# Patient Record
Sex: FEMALE | Race: WHITE | Employment: FULL TIME | URBAN - METROPOLITAN AREA
[De-identification: names, ages, dates, MRNs, and addresses within clinical notes are randomized per-mention and may not be internally consistent; named-entity substitution may affect disease eponyms.]

---

## 2018-11-21 ENCOUNTER — OFFICE VISIT (OUTPATIENT)
Dept: FAMILY MEDICINE CLINIC | Age: 28
End: 2018-11-21

## 2018-11-21 VITALS
HEART RATE: 72 BPM | TEMPERATURE: 97.4 F | HEIGHT: 65 IN | SYSTOLIC BLOOD PRESSURE: 119 MMHG | WEIGHT: 132.1 LBS | BODY MASS INDEX: 22.01 KG/M2 | DIASTOLIC BLOOD PRESSURE: 61 MMHG | RESPIRATION RATE: 18 BRPM | OXYGEN SATURATION: 98 %

## 2018-11-21 DIAGNOSIS — Z87.442 HISTORY OF KIDNEY STONES: ICD-10-CM

## 2018-11-21 DIAGNOSIS — Z86.2 HISTORY OF IRON DEFICIENCY ANEMIA: ICD-10-CM

## 2018-11-21 DIAGNOSIS — Z91.018 FOOD ALLERGY: ICD-10-CM

## 2018-11-21 DIAGNOSIS — Z00.00 WELL WOMAN EXAM (NO GYNECOLOGICAL EXAM): Primary | ICD-10-CM

## 2018-11-21 RX ORDER — DROSPIRENONE AND ETHINYL ESTRADIOL 0.02-3(28)
KIT ORAL DAILY
COMMUNITY

## 2018-11-21 RX ORDER — EPINEPHRINE 0.3 MG/.3ML
0.3 INJECTION SUBCUTANEOUS
COMMUNITY
End: 2018-11-21 | Stop reason: ALTCHOICE

## 2018-11-21 RX ORDER — EPINEPHRINE 0.3 MG/.3ML
0.3 INJECTION SUBCUTANEOUS
Qty: 2 SYRINGE | Refills: 1 | Status: SHIPPED | OUTPATIENT
Start: 2018-11-21 | End: 2018-11-21

## 2018-11-21 RX ORDER — MOMETASONE FUROATE 1 MG/G
CREAM TOPICAL DAILY
COMMUNITY
End: 2019-01-04 | Stop reason: SDUPTHER

## 2018-11-21 NOTE — PROGRESS NOTES
April Xavi  Identified pt with two pt identifiers(name and ). Chief Complaint   Patient presents with   [facility] 9/est care       1. Have you been to the ER, urgent care clinic since your last visit? Hospitalized since your last visit? no    2. Have you seen or consulted any other health care providers outside of the [address] [place] [address] since your last visit? Include any pap smears or colon screening. no      Advance Care Planning    In the event something were to happen to you and you were unable to speak on your behalf, do you have an Advance Directive/ Living Will in place stating your wishes? NO    If yes, do we have a copy on file NO    If no, would you like information NO    Medication reconciliation up to date and corrected with patient at this time. Today's provider has been notified of reason for visit, vitals and flowsheets obtained on patients. Reviewed record in preparation for visit, huddled with provider and have obtained necessary documentation. Health Maintenance Due   Topic    HPV Age 9Y-34Y (1 - Female 3-dose series)    DTaP/Tdap/Td series (1 - Tdap)    PAP AKA CERVICAL CYTOLOGY     Influenza Age 5 to Adult        Wt Readings from Last 3 Encounters:   No data found for Wt     Temp Readings from Last 3 Encounters:   No data found for Temp     BP Readings from Last 3 Encounters:   No data found for BP     Pulse Readings from Last 3 Encounters:   No data found for Pulse     There were no vitals filed for this visit. Learning Assessment:  :     No flowsheet data found. Depression Screening:  :     PHQ over the last two weeks 2018   Little interest or pleasure in doing things Not at all   Feeling down, depressed, irritable, or hopeless Not at all   Total Score PHQ 2 0       No flowsheet data found. Fall Risk Assessment:  :     No flowsheet data found.     Abuse Screening:  :     Abuse Screening Questionnaire 2018   Do you ever feel afraid of your partner? N   Are you in a relationship with someone who physically or mentally threatens you? N   Is it safe for you to go home? Y       ADL Screening:  :     No flowsheet data found. BMI:  No flowsheet data found. Medication reconciliation up to date and corrected with patient at this time.

## 2018-11-21 NOTE — PROGRESS NOTES
Subjective:   Mel Quiñonez is a 34 y.o. female who presents to [facility]. She was previously seen by Dr. Randolph Duff in [place]    She moved to [place] one year ago from [place]. 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 She lives with her boyfriend. No children. She has a dog. She follows a vegan diet and a vegan protein powder that she makes a smoothie with daily. However, she will have turkey for Thanksgiving tomorrow. She exercises four times weekly for 1.5 hours with cardio, light weight and pole fitness. She sees her gyn at [facility] for yearly pap smear and has had high risk HPV positive on her pap smears. She did have the vaccine. She has not required any procedures yet. She is on BCP from gyn. Food allergies - she has had anaphylaxis to latex, kiwi, and tree nuts. She carries Epi-pen and needs refill. Has not seen allergist in many years. She is not aware of prior cholesterol checks. She had iron deficient anemia as a teenager, but states it resolved at age 25 and was normal when last checked 9/2017. No h/o thyroid disease or vitamin D deficiency. She believes she had a Tdap in the last 5 years at prior PCP. Past Medical History:   Diagnosis Date    Anemia 0401-5486    iron deficiency    Food allergy     anaphylaxis to kiwi, tree nuts, latex    HPV (human papilloma virus) anogenital infection 2014    Kidney stones 2012    passed stone spontaneously, stone not analyzed    UTI (urinary tract infection) 2015     Social History     Tobacco Use    Smoking status: Never Smoker    Smokeless tobacco: Never Used   Substance Use Topics    Alcohol use: No     Frequency: Never    Drug use: No     Outpatient Medications Marked as Taking for the 11/21/18 encounter (Office Visit) with Nafisa Cody PA-C   Medication Sig Dispense Refill    EPINEPHrine (EPIPEN) 0.3 mg/0.3 mL injection 0.3 mg by IntraMUSCular route once as needed.       drospirenone-ethinyl estradiol (AMANDA, 28,) 3-0.02 mg tab Take  by mouth daily.  mometasone (ELOCON) 0.1 % topical cream Apply  to affected area daily. Allergies   Allergen Reactions    Latex Anaphylaxis    Kiwi Anaphylaxis    Tree Nut Anaphylaxis        Review of Systems  A comprehensive review of systems was negative except for that written in the HPI. Objective:     Visit Vitals  /61 (BP 1 Location: Left arm, BP Patient Position: Sitting)   Pulse 72   Temp 97.4 °F (36.3 °C) (Oral)   Resp 18   Ht 5' 5.25\" (1.657 m)   Wt 132 lb 1.6 oz (59.9 kg)   LMP 11/19/2018 (Exact Date)   SpO2 98%   BMI 21.81 kg/m²     General:   alert, cooperative   Eyes: conjunctivae/scleras clear. PERRL, EOM's intact   Ears: External auditory canals clear, tympanic membranes clear   Sinuses/Nose: No maxillary or frontal tenderness. Mouth:  No oral lesions, no pharyngeal erythema, no exudates   Neck: Supple, trachea midline   Heart: S1 and S2 normal,no murmurs noted    Lungs: Clear to auscultation bilaterally, no increased work of breathing   Abdomen: Soft, nontender. Normal bowel sounds   Extremities: No edema or cyanosis          No results found for this visit on 11/21/18. Assessment/Plan:       ICD-10-CM ICD-9-CM    1. Well woman exam (no gynecological exam) - she is followed by gyn for annual paps, BCP and history of high risk HPV Z00.00 V70.0 CBC WITH AUTOMATED DIFF      METABOLIC PANEL, COMPREHENSIVE      TSH 3RD GENERATION      LIPID PANEL      IRON PROFILE      VITAMIN D, 25 HYDROXY      UA/M W/RFLX CULTURE, ROUTINE      HEMOGLOBIN A1C WITH EAG   2. Food allergy Z91.018 V15.05 EPINEPHrine (AUVI-Q) 0.3 mg/0.3 mL injection   3. History of kidney stones V13.268 V69.02 METABOLIC PANEL, COMPREHENSIVE      VITAMIN D, 25 HYDROXY      UA/M W/RFLX CULTURE, ROUTINE   4.  History of iron deficiency anemia Z86.2 V12.3 CBC WITH AUTOMATED DIFF      IRON PROFILE     Health Maintenance - she received HPV vaccine, she had pap with gyn, she believes she had Tdap with prior PCP in [place] within last 5 years    Forms filled out to request prior medical records. Follow up annually and as needed    Verbal and written instructions (see AVS) provided. Patient expresses understanding of diagnosis and treatment plan.

## 2018-11-21 NOTE — PATIENT INSTRUCTIONS

## 2018-11-22 LAB
25(OH)D3+25(OH)D2 SERPL-MCNC: 37.2 NG/ML (ref 30–100)
ALBUMIN SERPL-MCNC: 4.1 G/DL (ref 3.5–5.5)
ALBUMIN/GLOB SERPL: 1.5 {RATIO} (ref 1.2–2.2)
ALP SERPL-CCNC: 66 IU/L (ref 39–117)
ALT SERPL-CCNC: 10 IU/L (ref 0–32)
APPEARANCE UR: CLEAR
AST SERPL-CCNC: 20 IU/L (ref 0–40)
BACTERIA #/AREA URNS HPF: NORMAL /[HPF]
BASOPHILS # BLD AUTO: 0 X10E3/UL (ref 0–0.2)
BASOPHILS NFR BLD AUTO: 0 %
BILIRUB SERPL-MCNC: 0.5 MG/DL (ref 0–1.2)
BILIRUB UR QL STRIP: NEGATIVE
BUN SERPL-MCNC: 11 MG/DL (ref 6–20)
BUN/CREAT SERPL: 15 (ref 9–23)
CALCIUM SERPL-MCNC: 9.2 MG/DL (ref 8.7–10.2)
CASTS URNS QL MICRO: NORMAL /LPF
CHLORIDE SERPL-SCNC: 102 MMOL/L (ref 96–106)
CHOLEST SERPL-MCNC: 156 MG/DL (ref 100–199)
CO2 SERPL-SCNC: 25 MMOL/L (ref 20–29)
COLOR UR: YELLOW
CREAT SERPL-MCNC: 0.73 MG/DL (ref 0.57–1)
EOSINOPHIL # BLD AUTO: 0.1 X10E3/UL (ref 0–0.4)
EOSINOPHIL NFR BLD AUTO: 2 %
EPI CELLS #/AREA URNS HPF: NORMAL /HPF
ERYTHROCYTE [DISTWIDTH] IN BLOOD BY AUTOMATED COUNT: 12.8 % (ref 12.3–15.4)
EST. AVERAGE GLUCOSE BLD GHB EST-MCNC: 103 MG/DL
GLOBULIN SER CALC-MCNC: 2.7 G/DL (ref 1.5–4.5)
GLUCOSE SERPL-MCNC: 85 MG/DL (ref 65–99)
GLUCOSE UR QL: NEGATIVE
HBA1C MFR BLD: 5.2 % (ref 4.8–5.6)
HCT VFR BLD AUTO: 42.4 % (ref 34–46.6)
HDLC SERPL-MCNC: 74 MG/DL
HGB BLD-MCNC: 14 G/DL (ref 11.1–15.9)
HGB UR QL STRIP: NEGATIVE
IMM GRANULOCYTES # BLD: 0 X10E3/UL (ref 0–0.1)
IMM GRANULOCYTES NFR BLD: 0 %
INTERPRETATION, 910389: NORMAL
IRON SATN MFR SERPL: 30 % (ref 15–55)
IRON SERPL-MCNC: 104 UG/DL (ref 27–159)
KETONES UR QL STRIP: NEGATIVE
LDLC SERPL CALC-MCNC: 72 MG/DL (ref 0–99)
LEUKOCYTE ESTERASE UR QL STRIP: NEGATIVE
LYMPHOCYTES # BLD AUTO: 2.6 X10E3/UL (ref 0.7–3.1)
LYMPHOCYTES NFR BLD AUTO: 51 %
MCH RBC QN AUTO: 28.6 PG (ref 26.6–33)
MCHC RBC AUTO-ENTMCNC: 33 G/DL (ref 31.5–35.7)
MCV RBC AUTO: 87 FL (ref 79–97)
MICRO URNS: NORMAL
MICRO URNS: NORMAL
MONOCYTES # BLD AUTO: 0.3 X10E3/UL (ref 0.1–0.9)
MONOCYTES NFR BLD AUTO: 6 %
MUCOUS THREADS URNS QL MICRO: PRESENT
NEUTROPHILS # BLD AUTO: 2.1 X10E3/UL (ref 1.4–7)
NEUTROPHILS NFR BLD AUTO: 41 %
NITRITE UR QL STRIP: NEGATIVE
PH UR STRIP: 6 [PH] (ref 5–7.5)
PLATELET # BLD AUTO: 235 X10E3/UL (ref 150–379)
POTASSIUM SERPL-SCNC: 4.2 MMOL/L (ref 3.5–5.2)
PROT SERPL-MCNC: 6.8 G/DL (ref 6–8.5)
PROT UR QL STRIP: NEGATIVE
RBC # BLD AUTO: 4.89 X10E6/UL (ref 3.77–5.28)
RBC #/AREA URNS HPF: NORMAL /HPF
SODIUM SERPL-SCNC: 139 MMOL/L (ref 134–144)
SP GR UR: 1.02 (ref 1–1.03)
TIBC SERPL-MCNC: 345 UG/DL (ref 250–450)
TRIGL SERPL-MCNC: 48 MG/DL (ref 0–149)
TSH SERPL DL<=0.005 MIU/L-ACNC: 1.17 UIU/ML (ref 0.45–4.5)
UIBC SERPL-MCNC: 241 UG/DL (ref 131–425)
URINALYSIS REFLEX, 377202: NORMAL
UROBILINOGEN UR STRIP-MCNC: 0.2 MG/DL (ref 0.2–1)
VLDLC SERPL CALC-MCNC: 10 MG/DL (ref 5–40)
WBC # BLD AUTO: 5 X10E3/UL (ref 3.4–10.8)
WBC #/AREA URNS HPF: NORMAL /HPF

## 2019-01-04 NOTE — TELEPHONE ENCOUNTER
----- Message from Rivera Bowers sent at 1/4/2019 12:49 PM EST -----  Regarding: Dr. Luis Manuel Harris refill   Contact: [phone]  Pt is requesting a refill for Rx  Mometasone to be sent to [facility]

## 2019-01-04 NOTE — TELEPHONE ENCOUNTER
Requested Prescriptions     Pending Prescriptions Disp Refills    mometasone (ELOCON) 0.1 % topical cream 15 g      Sig: Apply  to affected area daily.

## 2019-01-07 NOTE — TELEPHONE ENCOUNTER
PCP: Jorge Carranza PA-C    Last appt: 11/21/2018  No future appointments. Requested Prescriptions     Pending Prescriptions Disp Refills    mometasone (ELOCON) 0.1 % topical cream 15 g      Sig: Apply  to affected area daily.        Prior labs and Blood pressures:  BP Readings from Last 3 Encounters:   11/21/18 119/61     Lab Results   Component Value Date/Time    Sodium 139 11/21/2018 11:07 AM    Potassium 4.2 11/21/2018 11:07 AM    Chloride 102 11/21/2018 11:07 AM    CO2 25 11/21/2018 11:07 AM    Glucose 85 11/21/2018 11:07 AM    BUN 11 11/21/2018 11:07 AM    Creatinine 0.73 11/21/2018 11:07 AM    BUN/Creatinine ratio 15 11/21/2018 11:07 AM    GFR est  11/21/2018 11:07 AM    GFR est non- 11/21/2018 11:07 AM    Calcium 9.2 11/21/2018 11:07 AM     Lab Results   Component Value Date/Time    Hemoglobin A1c 5.2 11/21/2018 11:07 AM     Lab Results   Component Value Date/Time    Cholesterol, total 156 11/21/2018 11:07 AM    HDL Cholesterol 74 11/21/2018 11:07 AM    LDL, calculated 72 11/21/2018 11:07 AM    VLDL, calculated 10 11/21/2018 11:07 AM    Triglyceride 48 11/21/2018 11:07 AM     Lab Results   Component Value Date/Time    VITAMIN D, 25-HYDROXY 37.2 11/21/2018 11:07 AM       Lab Results   Component Value Date/Time    TSH 1.170 11/21/2018 11:07 AM

## 2019-01-10 NOTE — TELEPHONE ENCOUNTER
----- Message from [address] sent at 1/10/2019 11:40 AM EST -----  Regarding: MAGNUS Cody/refill  Pt is requesting refill on mometasone at [facility] on file.  Best contact number is [phone]

## 2019-01-10 NOTE — TELEPHONE ENCOUNTER
PCP: Floyd Thornotn PA-C    Last appt: 11/21/2018  No future appointments. Requested Prescriptions     Pending Prescriptions Disp Refills    mometasone (ELOCON) 0.1 % topical cream 15 g 1     Sig: Apply  to affected area daily.  drospirenone-ethinyl estradiol (AMANDA, 28,) 3-0.02 mg tab       Sig: Take  by mouth daily.        Prior labs and Blood pressures:  BP Readings from Last 3 Encounters:   11/21/18 119/61     Lab Results   Component Value Date/Time    Sodium 139 11/21/2018 11:07 AM    Potassium 4.2 11/21/2018 11:07 AM    Chloride 102 11/21/2018 11:07 AM    CO2 25 11/21/2018 11:07 AM    Glucose 85 11/21/2018 11:07 AM    BUN 11 11/21/2018 11:07 AM    Creatinine 0.73 11/21/2018 11:07 AM    BUN/Creatinine ratio 15 11/21/2018 11:07 AM    GFR est  11/21/2018 11:07 AM    GFR est non- 11/21/2018 11:07 AM    Calcium 9.2 11/21/2018 11:07 AM     Lab Results   Component Value Date/Time    Hemoglobin A1c 5.2 11/21/2018 11:07 AM     Lab Results   Component Value Date/Time    Cholesterol, total 156 11/21/2018 11:07 AM    HDL Cholesterol 74 11/21/2018 11:07 AM    LDL, calculated 72 11/21/2018 11:07 AM    VLDL, calculated 10 11/21/2018 11:07 AM    Triglyceride 48 11/21/2018 11:07 AM     Lab Results   Component Value Date/Time    VITAMIN D, 25-HYDROXY 37.2 11/21/2018 11:07 AM       Lab Results   Component Value Date/Time    TSH 1.170 11/21/2018 11:07 AM

## 2019-01-10 NOTE — TELEPHONE ENCOUNTER
----- Message from Francesco Sandhu sent at 1/10/2019 12:21 PM EST -----  Regarding: Odalis Noyola PA-C/ telephone  Pt would need to know the status of her prescriptions that were supposed to be refilled. Pt is now also requesting a refill on Birth Control. Pt uses [facility] (information is on file) Pt states that all of the information from her old PCP should have been already faxed over to the office.  Pts contact [phone]

## 2019-01-11 RX ORDER — MOMETASONE FUROATE 1 MG/G
CREAM TOPICAL DAILY
Qty: 15 G | Refills: 1 | Status: SHIPPED | OUTPATIENT
Start: 2019-01-11 | End: 2019-04-19 | Stop reason: SDUPTHER

## 2019-01-11 RX ORDER — DROSPIRENONE AND ETHINYL ESTRADIOL 0.02-3(28)
KIT ORAL DAILY
OUTPATIENT
Start: 2019-01-11

## 2019-04-22 RX ORDER — MOMETASONE FUROATE 1 MG/G
CREAM TOPICAL
Qty: 15 G | Refills: 0 | Status: SHIPPED | OUTPATIENT
Start: 2019-04-22 | End: 2019-06-22 | Stop reason: SDUPTHER

## 2019-04-22 NOTE — TELEPHONE ENCOUNTER
PCP: Sherry Holt PA-C    Last appt: 11/21/2018  No future appointments.     Requested Prescriptions     Pending Prescriptions Disp Refills    mometasone (ELOCON) 0.1 % topical cream [Pharmacy Med Name: MOMETASONE 0.1% CREAM 15GM] 15 g 0     Sig: APPLY EXTERNALLY TO THE AFFECTED AREA DAILY       Prior labs and Blood pressures:  BP Readings from Last 3 Encounters:   11/21/18 119/61     Lab Results   Component Value Date/Time    Sodium 139 11/21/2018 11:07 AM    Potassium 4.2 11/21/2018 11:07 AM    Chloride 102 11/21/2018 11:07 AM    CO2 25 11/21/2018 11:07 AM    Glucose 85 11/21/2018 11:07 AM    BUN 11 11/21/2018 11:07 AM    Creatinine 0.73 11/21/2018 11:07 AM    BUN/Creatinine ratio 15 11/21/2018 11:07 AM    GFR est  11/21/2018 11:07 AM    GFR est non- 11/21/2018 11:07 AM    Calcium 9.2 11/21/2018 11:07 AM     Lab Results   Component Value Date/Time    Hemoglobin A1c 5.2 11/21/2018 11:07 AM     Lab Results   Component Value Date/Time    Cholesterol, total 156 11/21/2018 11:07 AM    HDL Cholesterol 74 11/21/2018 11:07 AM    LDL, calculated 72 11/21/2018 11:07 AM    VLDL, calculated 10 11/21/2018 11:07 AM    Triglyceride 48 11/21/2018 11:07 AM     Lab Results   Component Value Date/Time    VITAMIN D, 25-HYDROXY 37.2 11/21/2018 11:07 AM       Lab Results   Component Value Date/Time    TSH 1.170 11/21/2018 11:07 AM

## 2019-06-24 RX ORDER — MOMETASONE FUROATE 1 MG/G
CREAM TOPICAL
Qty: 15 G | Refills: 0 | Status: SHIPPED | OUTPATIENT
Start: 2019-06-24 | End: 2019-11-11 | Stop reason: SDUPTHER

## 2019-06-24 NOTE — TELEPHONE ENCOUNTER
PCP: Jayant Judge PA-C    Last appt: 11/21/2018  No future appointments.     Requested Prescriptions     Pending Prescriptions Disp Refills    mometasone (ELOCON) 0.1 % topical cream [Pharmacy Med Name: MOMETASONE 0.1% CREAM 15GM] 15 g 0     Sig: APPLY EXTERNALLY TO THE AFFECTED AREA DAILY       Prior labs and Blood pressures:  BP Readings from Last 3 Encounters:   11/21/18 119/61     Lab Results   Component Value Date/Time    Sodium 139 11/21/2018 11:07 AM    Potassium 4.2 11/21/2018 11:07 AM    Chloride 102 11/21/2018 11:07 AM    CO2 25 11/21/2018 11:07 AM    Glucose 85 11/21/2018 11:07 AM    BUN 11 11/21/2018 11:07 AM    Creatinine 0.73 11/21/2018 11:07 AM    BUN/Creatinine ratio 15 11/21/2018 11:07 AM    GFR est  11/21/2018 11:07 AM    GFR est non- 11/21/2018 11:07 AM    Calcium 9.2 11/21/2018 11:07 AM     Lab Results   Component Value Date/Time    Hemoglobin A1c 5.2 11/21/2018 11:07 AM     Lab Results   Component Value Date/Time    Cholesterol, total 156 11/21/2018 11:07 AM    HDL Cholesterol 74 11/21/2018 11:07 AM    LDL, calculated 72 11/21/2018 11:07 AM    VLDL, calculated 10 11/21/2018 11:07 AM    Triglyceride 48 11/21/2018 11:07 AM     Lab Results   Component Value Date/Time    VITAMIN D, 25-HYDROXY 37.2 11/21/2018 11:07 AM       Lab Results   Component Value Date/Time    TSH 1.170 11/21/2018 11:07 AM

## 2019-11-11 ENCOUNTER — OFFICE VISIT (OUTPATIENT)
Dept: FAMILY MEDICINE CLINIC | Age: 29
End: 2019-11-11

## 2019-11-11 VITALS
RESPIRATION RATE: 16 BRPM | HEART RATE: 77 BPM | OXYGEN SATURATION: 95 % | BODY MASS INDEX: 21.99 KG/M2 | WEIGHT: 132 LBS | TEMPERATURE: 98 F | HEIGHT: 65 IN | SYSTOLIC BLOOD PRESSURE: 117 MMHG | DIASTOLIC BLOOD PRESSURE: 76 MMHG

## 2019-11-11 DIAGNOSIS — Z91.018 FOOD ALLERGY: ICD-10-CM

## 2019-11-11 DIAGNOSIS — Z86.2 HISTORY OF IRON DEFICIENCY ANEMIA: ICD-10-CM

## 2019-11-11 DIAGNOSIS — L30.8 OTHER ECZEMA: ICD-10-CM

## 2019-11-11 DIAGNOSIS — Z00.00 WELL WOMAN EXAM (NO GYNECOLOGICAL EXAM): Primary | ICD-10-CM

## 2019-11-11 DIAGNOSIS — Z23 ENCOUNTER FOR IMMUNIZATION: ICD-10-CM

## 2019-11-11 RX ORDER — EPINEPHRINE 0.3 MG/.3ML
0.3 INJECTION SUBCUTANEOUS
Qty: 2 SYRINGE | Refills: 2 | Status: SHIPPED | OUTPATIENT
Start: 2019-11-11 | End: 2019-11-11

## 2019-11-11 RX ORDER — MOMETASONE FUROATE 1 MG/G
CREAM TOPICAL DAILY
Qty: 45 G | Refills: 1 | Status: SHIPPED | OUTPATIENT
Start: 2019-11-11

## 2019-11-11 NOTE — PROGRESS NOTES
Subjective:   34 y.o. female for Well Woman Check. Her gyne and breast care is done elsewhere by her Ob-Gyne physician, [facility], h/o high risk HPV. No changes since visit last year, 11/18/2019. 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 She continues to exercise four times weekly for 1.5 hours cardio and light weights. She had been vegan for many years, but has started eating more meat, chicken and beef at times. Food allergies - she has had anaphylaxis to latex, kiwi, and tree nuts. She carries Epi-pen and needs refill. Has not seen allergist in many years. I gave her refill for Auvi-Q last year, but she never got it filled. There had been some price increase and manufacturing issues with the branded Epipen. She had iron deficient anemia as a teenager, but states it resolved at age 25 and was normal when last checked 11/2019. She uses elocon about once weekly for a rash she gets after shaving, red itchy bumps. She shaves her arms and legs. She does not use a shaving cream. She applies cetaphil lotion afterwards. Rash just started happening over the last 2 years. She uses disposable razors, but not a fresh blade very time. She had Tdap around 2014 at prior PCP in [place]. Patient Active Problem List   Diagnosis Code    Food allergy Z91.018    HPV (human papilloma virus) anogenital infection A63.0     Current Outpatient Medications   Medication Sig Dispense Refill    mometasone (ELOCON) 0.1 % topical cream Apply  to affected area daily. 45 g 1    EPINEPHrine (EPIPEN) 0.3 mg/0.3 mL injection 0.3 mL by IntraMUSCular route once as needed for Anaphylaxis (food allergy) for up to 1 dose. 2 Syringe 2    drospirenone-ethinyl estradiol (AMANDA, Verenice,) 3-0.02 mg tab Take  by mouth daily.        Allergies   Allergen Reactions    Latex Anaphylaxis    Kiwi Anaphylaxis    Tree Nut Anaphylaxis     Past Medical History:   Diagnosis Date    Anemia 5238-8248 iron deficiency    Food allergy     anaphylaxis to kiwi, tree nuts, latex    HPV (human papilloma virus) anogenital infection 2014    Kidney stones 2012    passed stone spontaneously, stone not analyzed    UTI (urinary tract infection) 2015     History reviewed. No pertinent surgical history.   Family History   Problem Relation Age of Onset    Cancer Mother     Ataxia Father     Depression Brother     No Known Problems Maternal Uncle     No Known Problems Paternal Aunt     No Known Problems Maternal Grandmother     Cancer Paternal Grandmother     Ataxia Paternal Grandfather      Social History     Tobacco Use    Smoking status: Never Smoker    Smokeless tobacco: Never Used   Substance Use Topics    Alcohol use: No     Frequency: Never        Lab Results   Component Value Date/Time    WBC 5.0 11/21/2018 11:07 AM    HGB 14.0 11/21/2018 11:07 AM    HCT 42.4 11/21/2018 11:07 AM    PLATELET 115 43/58/6201 11:07 AM    MCV 87 11/21/2018 11:07 AM     Lab Results   Component Value Date/Time    Cholesterol, total 156 11/21/2018 11:07 AM    HDL Cholesterol 74 11/21/2018 11:07 AM    LDL, calculated 72 11/21/2018 11:07 AM    Triglyceride 48 11/21/2018 11:07 AM     Lab Results   Component Value Date/Time    TSH 1.170 11/21/2018 11:07 AM      Lab Results   Component Value Date/Time    Sodium 139 11/21/2018 11:07 AM    Potassium 4.2 11/21/2018 11:07 AM    Chloride 102 11/21/2018 11:07 AM    CO2 25 11/21/2018 11:07 AM    Glucose 85 11/21/2018 11:07 AM    BUN 11 11/21/2018 11:07 AM    Creatinine 0.73 11/21/2018 11:07 AM    BUN/Creatinine ratio 15 11/21/2018 11:07 AM    GFR est  11/21/2018 11:07 AM    GFR est non- 11/21/2018 11:07 AM    Calcium 9.2 11/21/2018 11:07 AM      Lab Results   Component Value Date/Time    Sodium 139 11/21/2018 11:07 AM    Potassium 4.2 11/21/2018 11:07 AM    Chloride 102 11/21/2018 11:07 AM    CO2 25 11/21/2018 11:07 AM    Glucose 85 11/21/2018 11:07 AM    BUN 11 11/21/2018 11:07 AM Creatinine 0.73 11/21/2018 11:07 AM    BUN/Creatinine ratio 15 11/21/2018 11:07 AM    GFR est  11/21/2018 11:07 AM    GFR est non- 11/21/2018 11:07 AM    Calcium 9.2 11/21/2018 11:07 AM    Bilirubin, total 0.5 11/21/2018 11:07 AM    ALT (SGPT) 10 11/21/2018 11:07 AM    AST (SGOT) 20 11/21/2018 11:07 AM    Alk. phosphatase 66 11/21/2018 11:07 AM    Protein, total 6.8 11/21/2018 11:07 AM    Albumin 4.1 11/21/2018 11:07 AM    A-G Ratio 1.5 11/21/2018 11:07 AM      Lab Results   Component Value Date/Time    Hemoglobin A1c 5.2 11/21/2018 11:07 AM         ROS: Feeling generally well. No TIA's or unusual headaches, no dysphagia. No prolonged cough. No dyspnea or chest pain on exertion. No abdominal pain, change in bowel habits, black or bloody stools. No urinary tract symptoms. No new or unusual musculoskeletal symptoms. Objective: The patient appears well, alert, oriented x 3, in no distress. Visit Vitals  /76 (BP 1 Location: Left arm, BP Patient Position: Sitting)   Pulse 77   Temp 98 °F (36.7 °C) (Oral)   Resp 16   Ht 5' 5.25\" (1.657 m)   Wt 132 lb (59.9 kg)   LMP 11/07/2019   SpO2 95%   BMI 21.80 kg/m²     ENT normal.  Neck supple. No adenopathy or thyromegaly. ROBIN. Lungs are clear, good air entry, no wheezes, rhonchi or rales. S1 and S2 normal, no murmurs, regular rate and rhythm. Abdomen soft without tenderness, guarding, mass or organomegaly. Extremities show no edema, normal peripheral pulses. Neurological is normal, no focal findings. Breast and Pelvic exams are deferred. Assessment/Plan:   Well Woman  routine labs ordered, continue current diet and exercise routine, flu shot today    ICD-10-CM ICD-9-CM    1.  Well woman exam (no gynecological exam) - she is followed by gyn for annual paps, BCP and history of high risk HPV A42.75 F73.3 METABOLIC PANEL, COMPREHENSIVE      CBC WITH AUTOMATED DIFF      HEMOGLOBIN A1C WITH EAG      TSH 3RD GENERATION      VITAMIN D, 25 HYDROXY 2. Other eczema - rash after shaving arms and legs, uses elocon about once weekly, has not been using shaving cream; I advised on different types of shaving cream to use (Skintimite for sensitive skin, Aveeno), use a new blade every time, moisturize afterwards   L30.8 692.9 mometasone (ELOCON) 0.1 % topical cream   3. Encounter for immunization - flu shot today, she had Tdap around 2014, she will confirm date with her PCP Z23 V03.89 INFLUENZA VIRUS VAC QUAD,SPLIT,PRESV FREE SYRINGE IM      AK IMMUNIZ ADMIN,1 SINGLE/COMB VAC/TOXOID   4. History of iron deficiency anemia - CBC and iron levels normal when last checked 11/2108 Z86.2 V12.3    5. Food allergy - she has had anaphylaxis to latex, kiwi, and tree nuts.  Z91.018 V15.05 EPINEPHrine (EPIPEN) 0.3 mg/0.3 mL injection

## 2019-11-11 NOTE — PATIENT INSTRUCTIONS
Vaccine Information Statement    Influenza (Flu) Vaccine (Inactivated or Recombinant): What You Need to Know    Many Vaccine Information Statements are available in Pashto and other languages. See www.immunize.org/vis  Hojas de información sobre vacunas están disponibles en español y en muchos otros idiomas. Visite www.immunize.org/vis    1. Why get vaccinated? Influenza vaccine can prevent influenza (flu). Flu is a contagious disease that spreads around the [facility] every year, usually between October and May. Anyone can get the flu, but it is more dangerous for some people. Infants and young children, people 72years of age and older, pregnant women, and people with certain health conditions or a weakened immune system are at greatest risk of flu complications. Pneumonia, bronchitis, sinus infections and ear infections are examples of flu-related complications. If you have a medical condition, such as heart disease, cancer or diabetes, flu can make it worse. Flu can cause fever and chills, sore throat, muscle aches, fatigue, cough, headache, and runny or stuffy nose. Some people may have vomiting and diarrhea, though this is more common in children than adults. Each year thousands of people in the [facility] die from flu, and many more are hospitalized. Flu vaccine prevents millions of illnesses and flu-related visits to the doctor each year. 2. Influenza vaccines     CDC recommends everyone 10months of age and older get vaccinated every flu season. Children 6 months through 6years of age may need 2 doses during a single flu season. Everyone else needs only 1 dose each flu season. It takes about 2 weeks for protection to develop after vaccination. There are many flu viruses, and they are always changing. Each year a new flu vaccine is made to protect against three or four viruses that are likely to cause disease in the upcoming flu season.  Even when the vaccine doesnt exactly match these viruses, it may still provide some protection. Influenza vaccine does not cause flu. Influenza vaccine may be given at the same time as other vaccines. 3. Talk with your health care provider    Tell your vaccine provider if the person getting the vaccine:   Has had an allergic reaction after a previous dose of influenza vaccine, or has any severe, life-threatening allergies.  Has ever had Guillain-Barré Syndrome (also called GBS). In some cases, your health care provider may decide to postpone influenza vaccination to a future visit. People with minor illnesses, such as a cold, may be vaccinated. People who are moderately or severely ill should usually wait until they recover before getting influenza vaccine. Your health care provider can give you more information. 4. Risks of a reaction     Soreness, redness, and swelling where shot is given, fever, muscle aches, and headache can happen after influenza vaccine.  There may be a very small increased risk of Guillain-Barré Syndrome (GBS) after inactivated influenza vaccine (the flu shot). Alyse Dominguez children who get the flu shot along with pneumococcal vaccine (PCV13), and/or DTaP vaccine at the same time might be slightly more likely to have a seizure caused by fever. Tell your health care provider if a child who is getting flu vaccine has ever had a seizure. People sometimes faint after medical procedures, including vaccination. Tell your provider if you feel dizzy or have vision changes or ringing in the ears. As with any medicine, there is a very remote chance of a vaccine causing a severe allergic reaction, other serious injury, or death. 5. What if there is a serious problem? An allergic reaction could occur after the vaccinated person leaves the clinic.  If you see signs of a severe allergic reaction (hives, swelling of the face and throat, difficulty breathing, a fast heartbeat, dizziness, or weakness), call 9-1-1 and get the person to the nearest hospital.    For other signs that concern you, call your health care provider. Adverse reactions should be reported to the Vaccine Adverse Event Reporting System (VAERS). Your health care provider will usually file this report, or you can do it yourself. Visit the VAERS website at www.vaers. hhs.gov or call 2-514.106.2335. VAERS is only for reporting reactions, and VAERS staff do not give medical advice. 6. The National Vaccine Injury Compensation Program    The [facility] Vaccine Injury Compensation Program (VICP) is a federal program that was created to compensate people who may have been injured by certain vaccines. Visit the VICP website at www.[facility].gov/vaccinecompensation or call 7-959.152.5048 to learn about the program and about filing a claim. There is a time limit to file a claim for compensation. 7. How can I learn more?  Ask your health care provider.  Call your local or state health department.  Contact the Centers for Disease Control and Prevention (CDC):  - Call 5-723.851.8163 (1-800-CDC-INFO) or  - Visit CDCs influenza website at www.cdc.gov/flu    Vaccine Information Statement (Interim)  Inactivated Influenza Vaccine   8/15/2019  42 JUAQUIN Calvo [id number]   Department of Health and Human Services  Centers for Disease Control and Prevention    Office Use Only         Well Visit, Ages 25 to 48: Care Instructions  Your Care Instructions    Physical exams can help you stay healthy. Your doctor has checked your overall health and may have suggested ways to take good care of yourself. He or she also may have recommended tests. At home, you can help prevent illness with healthy eating, regular exercise, and other steps. Follow-up care is a key part of your treatment and safety. Be sure to make and go to all appointments, and call your doctor if you are having problems.  It's also a good idea to know your test results and keep a list of the medicines you take.  How can you care for yourself at home? · Reach and stay at a healthy weight. This will lower your risk for many problems, such as obesity, diabetes, heart disease, and high blood pressure. · Get at least 30 minutes of physical activity on most days of the week. Walking is a good choice. You also may want to do other activities, such as running, swimming, cycling, or playing tennis or team sports. Discuss any changes in your exercise program with your doctor. · Do not smoke or allow others to smoke around you. If you need help quitting, talk to your doctor about stop-smoking programs and medicines. These can increase your chances of quitting for good. · Talk to your doctor about whether you have any risk factors for sexually transmitted infections (STIs). Having one sex partner (who does not have STIs and does not have sex with anyone else) is a good way to avoid these infections. · Use birth control if you do not want to have children at this time. Talk with your doctor about the choices available and what might be best for you. · Protect your skin from too much sun. When you're outdoors from 10 a.m. to 4 p.m., stay in the shade or cover up with clothing and a hat with a wide brim. Wear sunglasses that block UV rays. Even when it's cloudy, put broad-spectrum sunscreen (SPF 30 or higher) on any exposed skin. · See a dentist one or two times a year for checkups and to have your teeth cleaned. · Wear a seat belt in the car. Follow your doctor's advice about when to have certain tests. These tests can spot problems early. For everyone  · Cholesterol. Have the fat (cholesterol) in your blood tested after age 21. Your doctor will tell you how often to have this done based on your age, family history, or other things that can increase your risk for heart disease. · Blood pressure. Have your blood pressure checked during a routine doctor visit.  Your doctor will tell you how often to check your blood pressure based on your age, your blood pressure results, and other factors. · Vision. Talk with your doctor about how often to have a glaucoma test.  · Diabetes. Ask your doctor whether you should have tests for diabetes. · Colon cancer. Your risk for colorectal cancer gets higher as you get older. Some experts say that adults should start regular screening at age 48 and stop at age 76. Others say to start before age 48 or continue after age 76. Talk with your doctor about your risk and when to start and stop screening. For women  · Breast exam and mammogram. Talk to your doctor about when you should have a clinical breast exam and a mammogram. Medical experts differ on whether and how often women under 50 should have these tests. Your doctor can help you decide what is right for you. · Cervical cancer screening test and pelvic exam. Begin with a Pap test at age 24. The test often is part of a pelvic exam. Starting at age 27, you may choose to have a Pap test, an HPV test, or both tests at the same time (called co-testing). Talk with your doctor about how often to have testing. · Tests for sexually transmitted infections (STIs). Ask whether you should have tests for STIs. You may be at risk if you have sex with more than one person, especially if your partners do not wear condoms. For men  · Tests for sexually transmitted infections (STIs). Ask whether you should have tests for STIs. You may be at risk if you have sex with more than one person, especially if you do not wear a condom. · Testicular cancer exam. Ask your doctor whether you should check your testicles regularly. · Prostate exam. Talk to your doctor about whether you should have a blood test (called a PSA test) for prostate cancer.  Experts differ on whether and when men should have this test. Some experts suggest it if you are older than 39 and are -American or have a father or brother who got prostate cancer when he was younger than 65.  When should you call for help? Watch closely for changes in your health, and be sure to contact your doctor if you have any problems or symptoms that concern you. Where can you learn more? Go to http://khanh-azalia.info/. Enter P072 in the search box to learn more about \"Well Visit, Ages 25 to 48: Care Instructions. \"  Current as of: December 13, 2018  Content Version: 12.2  © 6060-6184 [employer]. Care instructions adapted under license by [employer] (which disclaims liability or warranty for this information). If you have questions about a medical condition or this instruction, always ask your healthcare professional. [place] [address] any warranty or liability for your use of this information.

## 2019-11-11 NOTE — PROGRESS NOTES
1. Have you been to the ER, urgent care clinic since your last visit? Hospitalized since your last visit? No    2. Have you seen or consulted any other health care providers outside of the [address] [place] [address] since your last visit? Include any pap smears or colon screening.  No       Chief Complaint   Patient presents with    Annual Wellness Visit     35 y/o female reports GYN scheduled for 2/2020 at [facility]    Fatigue     Visit Vitals  /76 (BP 1 Location: Left arm, BP Patient Position: Sitting)   Pulse 77   Temp 98 °F (36.7 °C) (Oral)   Resp 16   Ht 5' 5.25\" (1.657 m)   Wt 132 lb (59.9 kg)   SpO2 95%   BMI 21.80 kg/m²

## 2019-11-13 ENCOUNTER — TELEPHONE (OUTPATIENT)
Dept: FAMILY MEDICINE CLINIC | Age: 29
End: 2019-11-13

## 2019-11-13 NOTE — TELEPHONE ENCOUNTER
Please call patient, looks like she has lab appt on Thur 11/14. Looks like she had her labs drawn on Mon, 11/11 when here for her visit. I don't have any other pending labs to be done, and I\"m okay if she wasn't fasting (since I didn't need to do a lipid panel). She can cancel the lab appt on 11/14 unless there is another reason she needed it that I\"m not aware of.

## 2019-11-13 NOTE — TELEPHONE ENCOUNTER
----- Message from [facility] sent at 11/12/2019 10:05 PM EST -----  Regarding: lab order  Pt has appt on 11/14 for labs. Please place order however FYI patient just had labs done.

## 2019-11-15 LAB
25(OH)D3+25(OH)D2 SERPL-MCNC: 35.7 NG/ML (ref 30–100)
ALBUMIN SERPL-MCNC: 4.8 G/DL (ref 3.5–5.5)
ALBUMIN/GLOB SERPL: 1.8 {RATIO} (ref 1.2–2.2)
ALP SERPL-CCNC: 66 IU/L (ref 39–117)
ALT SERPL-CCNC: 9 IU/L (ref 0–32)
AST SERPL-CCNC: 23 IU/L (ref 0–40)
BASOPHILS # BLD AUTO: 0 X10E3/UL (ref 0–0.2)
BASOPHILS NFR BLD AUTO: 1 %
BILIRUB SERPL-MCNC: 0.4 MG/DL (ref 0–1.2)
BUN SERPL-MCNC: 9 MG/DL (ref 6–20)
BUN/CREAT SERPL: 10 (ref 9–23)
CALCIUM SERPL-MCNC: 9.6 MG/DL (ref 8.7–10.2)
CHLORIDE SERPL-SCNC: 103 MMOL/L (ref 96–106)
CO2 SERPL-SCNC: 19 MMOL/L (ref 20–29)
CREAT SERPL-MCNC: 0.89 MG/DL (ref 0.57–1)
EOSINOPHIL # BLD AUTO: 0.1 X10E3/UL (ref 0–0.4)
EOSINOPHIL NFR BLD AUTO: 1 %
ERYTHROCYTE [DISTWIDTH] IN BLOOD BY AUTOMATED COUNT: 11.7 % (ref 12.3–15.4)
EST. AVERAGE GLUCOSE BLD GHB EST-MCNC: 100 MG/DL
GLOBULIN SER CALC-MCNC: 2.6 G/DL (ref 1.5–4.5)
GLUCOSE SERPL-MCNC: 86 MG/DL (ref 65–99)
HBA1C MFR BLD: 5.1 % (ref 4.8–5.6)
HCT VFR BLD AUTO: 41.3 % (ref 34–46.6)
HGB BLD-MCNC: 14.2 G/DL (ref 11.1–15.9)
IMM GRANULOCYTES # BLD AUTO: 0 X10E3/UL (ref 0–0.1)
IMM GRANULOCYTES NFR BLD AUTO: 0 %
LYMPHOCYTES # BLD AUTO: 3.2 X10E3/UL (ref 0.7–3.1)
LYMPHOCYTES NFR BLD AUTO: 44 %
MCH RBC QN AUTO: 29.1 PG (ref 26.6–33)
MCHC RBC AUTO-ENTMCNC: 34.4 G/DL (ref 31.5–35.7)
MCV RBC AUTO: 85 FL (ref 79–97)
MONOCYTES # BLD AUTO: 0.4 X10E3/UL (ref 0.1–0.9)
MONOCYTES NFR BLD AUTO: 5 %
NEUTROPHILS # BLD AUTO: 3.6 X10E3/UL (ref 1.4–7)
NEUTROPHILS NFR BLD AUTO: 49 %
PLATELET # BLD AUTO: 262 X10E3/UL (ref 150–450)
POTASSIUM SERPL-SCNC: 4.2 MMOL/L (ref 3.5–5.2)
PROT SERPL-MCNC: 7.4 G/DL (ref 6–8.5)
RBC # BLD AUTO: 4.88 X10E6/UL (ref 3.77–5.28)
SODIUM SERPL-SCNC: 140 MMOL/L (ref 134–144)
TSH SERPL DL<=0.005 MIU/L-ACNC: 2.3 UIU/ML (ref 0.45–4.5)
WBC # BLD AUTO: 7.3 X10E3/UL (ref 3.4–10.8)

## 2020-12-02 ENCOUNTER — OFFICE VISIT (OUTPATIENT)
Dept: FAMILY MEDICINE CLINIC | Age: 30
End: 2020-12-02
Payer: COMMERCIAL

## 2020-12-02 VITALS
HEIGHT: 65 IN | BODY MASS INDEX: 22.67 KG/M2 | WEIGHT: 136.1 LBS | TEMPERATURE: 97.6 F | RESPIRATION RATE: 12 BRPM | OXYGEN SATURATION: 97 % | SYSTOLIC BLOOD PRESSURE: 96 MMHG | DIASTOLIC BLOOD PRESSURE: 58 MMHG | HEART RATE: 52 BPM

## 2020-12-02 DIAGNOSIS — Z83.71 FAMILY HISTORY OF COLONIC POLYPS: ICD-10-CM

## 2020-12-02 DIAGNOSIS — Z13.220 LIPID SCREENING: ICD-10-CM

## 2020-12-02 DIAGNOSIS — Z00.00 LABORATORY EXAM ORDERED AS PART OF ROUTINE GENERAL MEDICAL EXAMINATION: ICD-10-CM

## 2020-12-02 DIAGNOSIS — Z91.018 FOOD ALLERGY: ICD-10-CM

## 2020-12-02 DIAGNOSIS — A63.0 HPV (HUMAN PAPILLOMA VIRUS) ANOGENITAL INFECTION: ICD-10-CM

## 2020-12-02 DIAGNOSIS — Z00.00 WELL WOMAN EXAM (NO GYNECOLOGICAL EXAM): Primary | ICD-10-CM

## 2020-12-02 DIAGNOSIS — Z86.2 HISTORY OF IRON DEFICIENCY ANEMIA: ICD-10-CM

## 2020-12-02 PROCEDURE — 99395 PREV VISIT EST AGE 18-39: CPT | Performed by: PHYSICIAN ASSISTANT

## 2020-12-02 NOTE — PATIENT INSTRUCTIONS
Office Policies    Phone calls/patient messages:            Please allow up to 24 hours for someone in the office to contact you about your call or message. Be mindful your provider may be out of the office or your message may require further review. We encourage you to use [employer] for your messages as this is a faster, more efficient way to communicate with our office                         Medication Refills:            Prescription medications require 48-72 business hours to process. We encourage you to use [employer] for your refills. For controlled medications: Please allow 72 business hours to process. Certain medications may require you to  a written prescription at our office. NO narcotic/controlled medications will be prescribed after 4pm Monday through Friday or on weekends              Form/Paperwork Completion:            Please note a $25 fee may incur for all paperwork for completed by our providers. We ask that you allow 7-10 business days. Pre-payment is due prior to picking up/faxing the completed form. You may also download your forms to [employer] to have your doctor print off. Well Visit, Ages 25 to 48: Care Instructions  Your Care Instructions     Physical exams can help you stay healthy. Your doctor has checked your overall health and may have suggested ways to take good care of yourself. He or she also may have recommended tests. At home, you can help prevent illness with healthy eating, regular exercise, and other steps. Follow-up care is a key part of your treatment and safety. Be sure to make and go to all appointments, and call your doctor if you are having problems. It's also a good idea to know your test results and keep a list of the medicines you take. How can you care for yourself at home? · Reach and stay at a healthy weight. This will lower your risk for many problems, such as obesity, diabetes, heart disease, and high blood pressure.   · Get at least 27 minutes of physical activity on most days of the week. Walking is a good choice. You also may want to do other activities, such as running, swimming, cycling, or playing tennis or team sports. Discuss any changes in your exercise program with your doctor. · Do not smoke or allow others to smoke around you. If you need help quitting, talk to your doctor about stop-smoking programs and medicines. These can increase your chances of quitting for good. · Talk to your doctor about whether you have any risk factors for sexually transmitted infections (STIs). Having one sex partner (who does not have STIs and does not have sex with anyone else) is a good way to avoid these infections. · Use birth control if you do not want to have children at this time. Talk with your doctor about the choices available and what might be best for you. · Protect your skin from too much sun. When you're outdoors from 10 a.m. to 4 p.m., stay in the shade or cover up with clothing and a hat with a wide brim. Wear sunglasses that block UV rays. Even when it's cloudy, put broad-spectrum sunscreen (SPF 30 or higher) on any exposed skin. · See a dentist one or two times a year for checkups and to have your teeth cleaned. · Wear a seat belt in the car. Follow your doctor's advice about when to have certain tests. These tests can spot problems early. For everyone  · Cholesterol. Have the fat (cholesterol) in your blood tested after age 21. Your doctor will tell you how often to have this done based on your age, family history, or other things that can increase your risk for heart disease. · Blood pressure. Have your blood pressure checked during a routine doctor visit. Your doctor will tell you how often to check your blood pressure based on your age, your blood pressure results, and other factors. · Vision. Talk with your doctor about how often to have a glaucoma test.  · Diabetes.  Ask your doctor whether you should have tests for diabetes. · Colon cancer. Your risk for colorectal cancer gets higher as you get older. Some experts say that adults should start regular screening at age 48 and stop at age 76. Others say to start before age 48 or continue after age 76. Talk with your doctor about your risk and when to start and stop screening. For women  · Breast exam and mammogram. Talk to your doctor about when you should have a clinical breast exam and a mammogram. Medical experts differ on whether and how often women under 50 should have these tests. Your doctor can help you decide what is right for you. · Cervical cancer screening test and pelvic exam. Begin with a Pap test at age 24. The test often is part of a pelvic exam. Starting at age 27, you may choose to have a Pap test, an HPV test, or both tests at the same time (called co-testing). Talk with your doctor about how often to have testing. · Tests for sexually transmitted infections (STIs). Ask whether you should have tests for STIs. You may be at risk if you have sex with more than one person, especially if your partners do not wear condoms. For men  · Tests for sexually transmitted infections (STIs). Ask whether you should have tests for STIs. You may be at risk if you have sex with more than one person, especially if you do not wear a condom. · Testicular cancer exam. Ask your doctor whether you should check your testicles regularly. · Prostate exam. Talk to your doctor about whether you should have a blood test (called a PSA test) for prostate cancer. Experts differ on whether and when men should have this test. Some experts suggest it if you are older than 39 and are -American or have a father or brother who got prostate cancer when he was younger than 72. When should you call for help? Watch closely for changes in your health, and be sure to contact your doctor if you have any problems or symptoms that concern you. Where can you learn more?   Go to http://khanh-azalia.info/  Enter P072 in the search box to learn more about \"Well Visit, Ages 25 to 48: Care Instructions. \"  Current as of: May 27, 2020               Content Version: 12.6  © 4504-7423 [employer]. Care instructions adapted under license by [employer] (which disclaims liability or warranty for this information). If you have questions about a medical condition or this instruction, always ask your healthcare professional. [address] any warranty or liability for your use of this information.

## 2020-12-02 NOTE — PROGRESS NOTES
Subjective:   31 y.o. female for Well Woman Check. Her gyne and breast care is done elsewhere by her Ob-Gyne physician, [facility], Dr Alberto Hamman, h/o high risk HPV. No changes since visit last year, 11/11/2019. 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 She continues to exercise five times weekly for 1.5 hours cardio and light weights. She had been vegan for many years, but is eating all types of meat now.      Food allergies - she has had anaphylaxis to latex, kiwi, and tree nuts. She carries Epi-pen and does not refill. Has not seen allergist in many years. .      She had iron deficient anemia as a teenager, but states it resolved at age 25 and was normal when last checked 11/2019. Has not needed the elocon since last visit, since she is using shaving cream now and a fresh blade every time, it resolved with techniques that I recommended for her last time.       She had Tdap around 2014 at prior PCP in [place], she will try to get the records    Her father passed away with dementia. Her mother had precancerous colon polyp this year, age 58    Patient Active Problem List    Diagnosis Date Noted    Food allergy     HPV (human papilloma virus) anogenital infection 01/01/2014     Current Outpatient Medications   Medication Sig Dispense Refill    drospirenone-ethinyl estradiol (AMANDA, 28,) 3-0.02 mg tab Take  by mouth daily.  mometasone (ELOCON) 0.1 % topical cream Apply  to affected area daily. 45 g 1     Allergies   Allergen Reactions    Latex Anaphylaxis    Kiwi Anaphylaxis    Tree Nut Anaphylaxis     Past Medical History:   Diagnosis Date    Anemia [id number]    iron deficiency    Food allergy     anaphylaxis to kiwi, tree nuts, latex    HPV (human papilloma virus) anogenital infection 2014    Kidney stones 2012    passed stone spontaneously, stone not analyzed    UTI (urinary tract infection) 2015     History reviewed.  No pertinent surgical history. Family History   Problem Relation Age of Onset   24 [facility] Mother     Other Mother 58        precancerous adenona    Ataxia Father     Dementia Father     Osteoporosis Father     Depression Brother     No Known Problems Maternal Uncle     No Known Problems Paternal Aunt     No Known Problems Maternal Grandmother     Cancer Paternal Grandmother     Ataxia Paternal Grandfather      Social History     Tobacco Use    Smoking status: Never Smoker    Smokeless tobacco: Never Used   Substance Use Topics    Alcohol use: No     Frequency: Never        Lab Results   Component Value Date/Time    WBC 7.3 11/14/2019 12:12 PM    HGB 14.2 11/14/2019 12:12 PM    HCT 41.3 11/14/2019 12:12 PM    PLATELET 240 84/40/9210 12:12 PM    MCV 85 11/14/2019 12:12 PM     Lab Results   Component Value Date/Time    Cholesterol, total 156 11/21/2018 11:07 AM    HDL Cholesterol 74 11/21/2018 11:07 AM    LDL, calculated 72 11/21/2018 11:07 AM    Triglyceride 48 11/21/2018 11:07 AM     Lab Results   Component Value Date/Time    TSH 2.300 11/14/2019 12:12 PM      Lab Results   Component Value Date/Time    Sodium 140 11/14/2019 12:12 PM    Potassium 4.2 11/14/2019 12:12 PM    Chloride 103 11/14/2019 12:12 PM    CO2 19 (L) 11/14/2019 12:12 PM    Glucose 86 11/14/2019 12:12 PM    BUN 9 11/14/2019 12:12 PM    Creatinine 0.89 11/14/2019 12:12 PM    BUN/Creatinine ratio 10 11/14/2019 12:12 PM    GFR est  11/14/2019 12:12 PM    GFR est non-AA 89 11/14/2019 12:12 PM    Calcium 9.6 11/14/2019 12:12 PM    Bilirubin, total 0.4 11/14/2019 12:12 PM    ALT (SGPT) 9 11/14/2019 12:12 PM    Alk. phosphatase 66 11/14/2019 12:12 PM    Protein, total 7.4 11/14/2019 12:12 PM    Albumin 4.8 11/14/2019 12:12 PM    A-G Ratio 1.8 11/14/2019 12:12 PM      Lab Results   Component Value Date/Time    Hemoglobin A1c 5.1 11/14/2019 12:12 PM         ROS: Feeling generally well. No TIA's or unusual headaches, no dysphagia. No prolonged cough.  No dyspnea or chest pain on exertion. No abdominal pain, change in bowel habits, black or bloody stools. No urinary tract symptoms. No new or unusual musculoskeletal symptoms. Objective: The patient appears well, alert, oriented x 3, in no distress. Visit Vitals  BP (!) 96/58 (BP 1 Location: Left arm, BP Patient Position: Sitting)   Pulse (!) 52   Temp 97.6 °F (36.4 °C) (Temporal)   Resp 12   Ht 5' 5.25\" (1.657 m)   Wt 136 lb 1.6 oz (61.7 kg)   LMP 11/18/2020 (Exact Date)   SpO2 97%   BMI 22.48 kg/m²     ENT normal.  Neck supple. No adenopathy or thyromegaly. ROBIN. Lungs are clear, good air entry, no wheezes, rhonchi or rales. S1 and S2 normal, no murmurs, regular rate and rhythm. Abdomen soft without tenderness, guarding, mass or organomegaly. Extremities show no edema, normal peripheral pulses. Neurological is normal, no focal findings. Breast and Pelvic exams are deferred. Assessment/Plan:   Well Woman  continue present diet with no restrictions, continue present plan, routine labs ordered    ICD-10-CM ICD-9-CM    1. Well woman exam (no gynecological exam)  T45.06 B44.2 METABOLIC PANEL, COMPREHENSIVE      CBC WITH AUTOMATED DIFF      LIPID PANEL      TSH 3RD GENERATION      TSH 3RD GENERATION      LIPID PANEL      CBC WITH AUTOMATED DIFF      METABOLIC PANEL, COMPREHENSIVE   2. History of iron deficiency anemia  Z86.2 V12.3 CBC WITH AUTOMATED DIFF      CBC WITH AUTOMATED DIFF   3. Food allergy  Z91.018 V15.05    4. HPV (human papilloma virus) anogenital infection  A63.0 079.4    5. Laboratory exam ordered as part of routine general medical examination  Q29.91 E19.73 METABOLIC PANEL, COMPREHENSIVE      CBC WITH AUTOMATED DIFF      LIPID PANEL      TSH 3RD GENERATION      TSH 3RD GENERATION      LIPID PANEL      CBC WITH AUTOMATED DIFF      METABOLIC PANEL, COMPREHENSIVE   6. Lipid screening  Z13.220 V77.91 LIPID PANEL      LIPID PANEL   7.  Family history of colonic polyps  Z83.71 V18.51         recommend colonoscopy age 38 due to mother's history of advanced colon polyp  Advanced directive signed in office today  Request Tdap from prior PCP in [place]  Continued f/u with Gyn

## 2020-12-03 LAB
ALBUMIN SERPL-MCNC: 4.1 G/DL (ref 3.5–5)
ALBUMIN/GLOB SERPL: 1.2 {RATIO} (ref 1.1–2.2)
ALP SERPL-CCNC: 75 U/L (ref 45–117)
ALT SERPL-CCNC: 15 U/L (ref 12–78)
ANION GAP SERPL CALC-SCNC: 5 MMOL/L (ref 5–15)
AST SERPL-CCNC: 18 U/L (ref 15–37)
BASOPHILS # BLD: 0 K/UL (ref 0–0.1)
BASOPHILS NFR BLD: 1 % (ref 0–1)
BILIRUB SERPL-MCNC: 0.5 MG/DL (ref 0.2–1)
BUN SERPL-MCNC: 7 MG/DL (ref 6–20)
BUN/CREAT SERPL: 9 (ref 12–20)
CALCIUM SERPL-MCNC: 9.2 MG/DL (ref 8.5–10.1)
CHLORIDE SERPL-SCNC: 109 MMOL/L (ref 97–108)
CHOLEST SERPL-MCNC: 193 MG/DL
CO2 SERPL-SCNC: 24 MMOL/L (ref 21–32)
CREAT SERPL-MCNC: 0.78 MG/DL (ref 0.55–1.02)
DIFFERENTIAL METHOD BLD: ABNORMAL
EOSINOPHIL # BLD: 0 K/UL (ref 0–0.4)
EOSINOPHIL NFR BLD: 1 % (ref 0–7)
ERYTHROCYTE [DISTWIDTH] IN BLOOD BY AUTOMATED COUNT: 12.3 % (ref 11.5–14.5)
GLOBULIN SER CALC-MCNC: 3.5 G/DL (ref 2–4)
GLUCOSE SERPL-MCNC: 81 MG/DL (ref 65–100)
HCT VFR BLD AUTO: 43.4 % (ref 35–47)
HDLC SERPL-MCNC: 91 MG/DL
HDLC SERPL: 2.1 {RATIO} (ref 0–5)
HGB BLD-MCNC: 14 G/DL (ref 11.5–16)
IMM GRANULOCYTES # BLD AUTO: 0 K/UL (ref 0–0.04)
IMM GRANULOCYTES NFR BLD AUTO: 0 % (ref 0–0.5)
LDLC SERPL CALC-MCNC: 84.2 MG/DL (ref 0–100)
LIPID PROFILE,FLP: NORMAL
LYMPHOCYTES # BLD: 3.2 K/UL (ref 0.8–3.5)
LYMPHOCYTES NFR BLD: 39 % (ref 12–49)
MCH RBC QN AUTO: 28.7 PG (ref 26–34)
MCHC RBC AUTO-ENTMCNC: 32.3 G/DL (ref 30–36.5)
MCV RBC AUTO: 88.9 FL (ref 80–99)
MONOCYTES # BLD: 0.3 K/UL (ref 0–1)
MONOCYTES NFR BLD: 4 % (ref 5–13)
NEUTS SEG # BLD: 4.6 K/UL (ref 1.8–8)
NEUTS SEG NFR BLD: 55 % (ref 32–75)
NRBC # BLD: 0 K/UL (ref 0–0.01)
NRBC BLD-RTO: 0 PER 100 WBC
PLATELET # BLD AUTO: 259 K/UL (ref 150–400)
PMV BLD AUTO: 10.1 FL (ref 8.9–12.9)
POTASSIUM SERPL-SCNC: 4.7 MMOL/L (ref 3.5–5.1)
PROT SERPL-MCNC: 7.6 G/DL (ref 6.4–8.2)
RBC # BLD AUTO: 4.88 M/UL (ref 3.8–5.2)
SODIUM SERPL-SCNC: 138 MMOL/L (ref 136–145)
TRIGL SERPL-MCNC: 89 MG/DL (ref ?–150)
TSH SERPL DL<=0.05 MIU/L-ACNC: 2.05 UIU/ML (ref 0.36–3.74)
VLDLC SERPL CALC-MCNC: 17.8 MG/DL
WBC # BLD AUTO: 8.2 K/UL (ref 3.6–11)

## 2023-05-17 RX ORDER — DROSPIRENONE AND ETHINYL ESTRADIOL 0.02-3(28)
KIT ORAL DAILY
COMMUNITY

## 2023-05-17 RX ORDER — MOMETASONE FUROATE 1 MG/G
CREAM TOPICAL DAILY
COMMUNITY
Start: 2019-11-11